# Patient Record
Sex: FEMALE | Race: WHITE | NOT HISPANIC OR LATINO
[De-identification: names, ages, dates, MRNs, and addresses within clinical notes are randomized per-mention and may not be internally consistent; named-entity substitution may affect disease eponyms.]

---

## 2024-05-02 PROBLEM — Z00.00 ENCOUNTER FOR PREVENTIVE HEALTH EXAMINATION: Status: ACTIVE | Noted: 2024-05-02

## 2024-05-03 ENCOUNTER — APPOINTMENT (OUTPATIENT)
Dept: ENDOCRINOLOGY | Facility: CLINIC | Age: 69
End: 2024-05-03
Payer: MEDICARE

## 2024-05-03 VITALS
DIASTOLIC BLOOD PRESSURE: 68 MMHG | HEIGHT: 65.5 IN | BODY MASS INDEX: 29.46 KG/M2 | OXYGEN SATURATION: 97 % | HEART RATE: 85 BPM | SYSTOLIC BLOOD PRESSURE: 114 MMHG | WEIGHT: 179 LBS

## 2024-05-03 DIAGNOSIS — E03.9 HYPOTHYROIDISM, UNSPECIFIED: ICD-10-CM

## 2024-05-03 DIAGNOSIS — Z83.49 FAMILY HISTORY OF OTHER ENDOCRINE, NUTRITIONAL AND METABOLIC DISEASES: ICD-10-CM

## 2024-05-03 DIAGNOSIS — R63.5 ABNORMAL WEIGHT GAIN: ICD-10-CM

## 2024-05-03 DIAGNOSIS — L65.9 NONSCARRING HAIR LOSS, UNSPECIFIED: ICD-10-CM

## 2024-05-03 DIAGNOSIS — Z13.820 ENCOUNTER FOR SCREENING FOR OSTEOPOROSIS: ICD-10-CM

## 2024-05-03 DIAGNOSIS — Z86.39 PERSONAL HISTORY OF OTHER ENDOCRINE, NUTRITIONAL AND METABOLIC DISEASE: ICD-10-CM

## 2024-05-03 DIAGNOSIS — Z78.0 ASYMPTOMATIC MENOPAUSAL STATE: ICD-10-CM

## 2024-05-03 PROCEDURE — 99204 OFFICE O/P NEW MOD 45 MIN: CPT

## 2024-05-03 RX ORDER — ELECTROLYTES/DEXTROSE
SOLUTION, ORAL ORAL DAILY
Refills: 0 | Status: ACTIVE | COMMUNITY

## 2024-05-03 RX ORDER — INDOMETHACIN 50 MG/1
50 CAPSULE ORAL
Refills: 0 | Status: ACTIVE | COMMUNITY

## 2024-05-03 RX ORDER — LEVOTHYROXINE SODIUM 50 UG/1
50 TABLET ORAL DAILY
Refills: 0 | Status: ACTIVE | COMMUNITY

## 2024-05-03 RX ORDER — ROSUVASTATIN CALCIUM 10 MG/1
10 TABLET, FILM COATED ORAL DAILY
Refills: 0 | Status: ACTIVE | COMMUNITY

## 2024-05-03 NOTE — HISTORY OF PRESENT ILLNESS
[FreeTextEntry1] : 68 year old women, former smoker, with h/o hypothyroidism, POI, and HLD presented to establish for hypothyroidism and hair loss.   Labs March 2024- TSH 2.5, LDL-C 119, Tg 142  Diagnosed with hypothyroidism about 3 years ago. Currently on LT4 50 mcg daily.   Underwent menopause at age 34. Was on HRT for 15 years afterwards.   Used to eat lots of cookies. Has difficulty with weight loss despite cutting back sweats.  Occasionally binge eats.  Reports hair loss. Saw dermatologist and was told she doesn't have any scalp issues.

## 2024-05-03 NOTE — PHYSICAL EXAM
[Well Nourished] : well nourished [No Respiratory Distress] : no respiratory distress [Normal Insight/Judgement] : insight and judgment were intact

## 2024-05-03 NOTE — ASSESSMENT
[FreeTextEntry1] :  1-hypothyroidism Currently on levothyroxine 50 mcg daily Plan: -Thyroid profile fasting every morning  2-overweight-difficulty with weight loss Believes she needs a higher dose of levothyroxine Plan:  -Will check thyroid profile, lipid panel and A1c  3-hair loss Plan:  Will rule out underlying endocrinology  Discussed considering topical treatments like minoxidil   4-screening DEXA scan  Will call you with lab results and discuss if follow-up appointment is needed.

## 2024-05-06 DIAGNOSIS — Z87.891 PERSONAL HISTORY OF NICOTINE DEPENDENCE: ICD-10-CM

## 2024-05-24 ENCOUNTER — APPOINTMENT (OUTPATIENT)
Dept: RADIOLOGY | Facility: CLINIC | Age: 69
End: 2024-05-24

## 2024-05-24 ENCOUNTER — APPOINTMENT (OUTPATIENT)
Dept: MAMMOGRAPHY | Facility: CLINIC | Age: 69
End: 2024-05-24
Payer: MEDICARE

## 2024-05-24 PROCEDURE — 77085 DXA BONE DENSITY AXL VRT FX: CPT

## 2024-05-24 PROCEDURE — 77067 SCR MAMMO BI INCL CAD: CPT

## 2024-05-24 PROCEDURE — 77063 BREAST TOMOSYNTHESIS BI: CPT

## 2024-05-28 ENCOUNTER — NON-APPOINTMENT (OUTPATIENT)
Age: 69
End: 2024-05-28

## 2024-06-03 ENCOUNTER — APPOINTMENT (OUTPATIENT)
Dept: OBGYN | Facility: CLINIC | Age: 69
End: 2024-06-03
Payer: MEDICARE

## 2024-06-03 VITALS
DIASTOLIC BLOOD PRESSURE: 74 MMHG | HEIGHT: 65.5 IN | SYSTOLIC BLOOD PRESSURE: 130 MMHG | BODY MASS INDEX: 29.63 KG/M2 | WEIGHT: 180 LBS

## 2024-06-03 DIAGNOSIS — Z01.419 ENCOUNTER FOR GYNECOLOGICAL EXAMINATION (GENERAL) (ROUTINE) W/OUT ABNORMAL FINDINGS: ICD-10-CM

## 2024-06-03 PROCEDURE — G0101: CPT

## 2024-06-03 NOTE — REVIEW OF SYSTEMS
[Patient Intake Form Reviewed] : Patient intake form was reviewed [FreeTextEntry8] : lump on vulva, skin tag mons pubis

## 2024-06-03 NOTE — DISCUSSION/SUMMARY
[FreeTextEntry1] : Unremarkable CBE and SBE reviewed Mammogram ordered Pelvic exam significant for skin tag on  mons pubis, cyst like mass under sking right vestibule, lesion vs hyperpigmentation posterior fourchette For Consult with Dr. Gómez with possible biopsy for above findings Pap/HPV collected I reviewed practices to support bone health including Vitamin D3 (2000 IU) and calcium rich foods with limitation on calcium supplementation by tabular form to 600 MG by mouth daily, a minimum of 30 minutes of weight bearing exercise at least 3 x weekly and limited daily sun exposure, 10-15 minutes.  Healthy diet, exercise and sleep hygiene discussed The importance of a screening colonoscopy was discussed.  Patient verbalizes understanding of and agreement with this plan.  All questions answered to patient's satisfaction.

## 2024-06-03 NOTE — PHYSICAL EXAM
[Appropriately responsive] : appropriately responsive [Alert] : alert [No Acute Distress] : no acute distress [No Lymphadenopathy] : no lymphadenopathy [Regular Rate Rhythm] : regular rate rhythm [No Murmurs] : no murmurs [Clear to Auscultation B/L] : clear to auscultation bilaterally [Soft] : soft [Non-tender] : non-tender [Non-distended] : non-distended [No HSM] : No HSM [No Lesions] : no lesions [No Mass] : no mass [Oriented x3] : oriented x3 [Examination Of The Breasts] : a normal appearance [No Masses] : no breast masses were palpable [Labia Majora] : normal [Labia Minora] : normal [Normal] : normal [Uterine Adnexae] : non-palpable [FreeTextEntry1] : 1 cm skin tag mons pubis [FreeTextEntry2] : firm round mass right vestibule [FreeTextEntry4] : hyperpigmentation vs lesion, posterior fourchette

## 2024-06-03 NOTE — HISTORY OF PRESENT ILLNESS
[postmenopausal] : postmenopausal [TextBox_4] : 68 year old  for well woman exam. No longer sexually active Hx hypothyroid on Synthroid. S/P BTL.  Born with a skin tag on lower mons pubis that was always flat has now grown over past year and is raised from surface of skin.  Also notes a lump upper vestibule [Mammogramdate] : 5/24 [PapSmeardate] : 2020 [BoneDensityDate] : 5/24 [ColonoscopyDate] : 2016 [PGHxTotal] : 3 [[facility]] : 2 [PGHxAbortions] : 1 [[facility]] : 2

## 2024-06-04 LAB — HPV HIGH+LOW RISK DNA PNL CVX: NOT DETECTED

## 2024-06-05 ENCOUNTER — NON-APPOINTMENT (OUTPATIENT)
Age: 69
End: 2024-06-05

## 2024-06-09 LAB — CYTOLOGY CVX/VAG DOC THIN PREP: ABNORMAL

## 2024-06-21 ENCOUNTER — APPOINTMENT (OUTPATIENT)
Dept: OBGYN | Facility: CLINIC | Age: 69
End: 2024-06-21

## 2024-08-06 ENCOUNTER — OFFICE (OUTPATIENT)
Dept: URBAN - METROPOLITAN AREA CLINIC 12 | Facility: CLINIC | Age: 69
Setting detail: OPHTHALMOLOGY
End: 2024-08-06
Payer: MEDICARE

## 2024-08-06 ENCOUNTER — RX ONLY (RX ONLY)
Age: 69
End: 2024-08-06

## 2024-08-06 DIAGNOSIS — H35.3131: ICD-10-CM

## 2024-08-06 DIAGNOSIS — H26.491: ICD-10-CM

## 2024-08-06 DIAGNOSIS — H16.223: ICD-10-CM

## 2024-08-06 DIAGNOSIS — H43.393: ICD-10-CM

## 2024-08-06 PROBLEM — Z96.1 PSEUDOPHAKIA ; BOTH EYES: Status: ACTIVE | Noted: 2024-08-06

## 2024-08-06 PROBLEM — H52.7 REFRACTIVE ERROR ; BOTH EYES: Status: ACTIVE | Noted: 2024-08-06

## 2024-08-06 PROCEDURE — 92134 CPTRZ OPH DX IMG PST SGM RTA: CPT | Performed by: OPHTHALMOLOGY

## 2024-08-06 PROCEDURE — 99204 OFFICE O/P NEW MOD 45 MIN: CPT | Performed by: OPHTHALMOLOGY

## 2024-08-06 ASSESSMENT — CONFRONTATIONAL VISUAL FIELD TEST (CVF)
OD_FINDINGS: FULL
OS_FINDINGS: FULL

## 2025-03-31 ENCOUNTER — OFFICE (OUTPATIENT)
Dept: URBAN - METROPOLITAN AREA CLINIC 38 | Facility: CLINIC | Age: 70
Setting detail: OPHTHALMOLOGY
End: 2025-03-31
Payer: MEDICARE

## 2025-03-31 DIAGNOSIS — Z96.1: ICD-10-CM

## 2025-03-31 DIAGNOSIS — H01.004: ICD-10-CM

## 2025-03-31 DIAGNOSIS — H11.153: ICD-10-CM

## 2025-03-31 DIAGNOSIS — H00.025: ICD-10-CM

## 2025-03-31 DIAGNOSIS — H26.491: ICD-10-CM

## 2025-03-31 DIAGNOSIS — H35.3131: ICD-10-CM

## 2025-03-31 DIAGNOSIS — H43.811: ICD-10-CM

## 2025-03-31 DIAGNOSIS — H35.40: ICD-10-CM

## 2025-03-31 DIAGNOSIS — H01.001: ICD-10-CM

## 2025-03-31 PROCEDURE — 92250 FUNDUS PHOTOGRAPHY W/I&R: CPT | Performed by: OPHTHALMOLOGY

## 2025-03-31 PROCEDURE — 92012 INTRM OPH EXAM EST PATIENT: CPT | Performed by: OPHTHALMOLOGY

## 2025-03-31 ASSESSMENT — REFRACTION_MANIFEST
OD_VA1: 20/25
OS_SPHERE: PLANO
OD_CYLINDER: -1.25
OD_AXIS: 095
OD_SPHERE: +0.25
OS_ADD: +2.25
OS_VA2: 20/20
OD_VA2: 20/20
OS_VA1: 20/25
OS_AXIS: 090
OS_CYLINDER: -1.75
OU_VA: 20/20-2
OD_ADD: +2.25

## 2025-03-31 ASSESSMENT — REFRACTION_CURRENTRX
OS_VPRISM_DIRECTION: PROGS
OS_AXIS: 096
OD_AXIS: 098
OD_SPHERE: PLANO
OD_ADD: +2.75
OS_OVR_VA: 20/
OS_SPHERE: PLANO
OD_CYLINDER: -1.25
OD_OVR_VA: 20/
OD_VPRISM_DIRECTION: PROGS
OS_ADD: +2.75
OS_CYLINDER: -1.50

## 2025-03-31 ASSESSMENT — CONFRONTATIONAL VISUAL FIELD TEST (CVF)
OS_FINDINGS: FULL
OD_FINDINGS: FULL

## 2025-03-31 ASSESSMENT — VISUAL ACUITY
OS_BCVA: 20/25
OD_BCVA: 20/30+3

## 2025-06-12 PROBLEM — Z12.39 BREAST SCREENING: Status: ACTIVE | Noted: 2025-06-11

## 2025-06-13 ENCOUNTER — APPOINTMENT (OUTPATIENT)
Dept: OBGYN | Facility: CLINIC | Age: 70
End: 2025-06-13
Payer: MEDICARE

## 2025-06-13 VITALS
WEIGHT: 178 LBS | BODY MASS INDEX: 29.66 KG/M2 | HEIGHT: 65 IN | DIASTOLIC BLOOD PRESSURE: 76 MMHG | SYSTOLIC BLOOD PRESSURE: 106 MMHG

## 2025-06-13 PROBLEM — N76.4 VULVAR ABSCESS: Status: ACTIVE | Noted: 2025-06-13

## 2025-06-13 PROBLEM — Z01.419 ENCOUNTER FOR ANNUAL ROUTINE GYNECOLOGICAL EXAMINATION: Status: ACTIVE | Noted: 2025-06-13

## 2025-06-13 PROCEDURE — 82270 OCCULT BLOOD FECES: CPT

## 2025-06-13 PROCEDURE — G0101: CPT

## 2025-06-17 LAB — CYTOLOGY CVX/VAG DOC THIN PREP: ABNORMAL

## 2025-08-19 ENCOUNTER — APPOINTMENT (OUTPATIENT)
Dept: MAMMOGRAPHY | Facility: CLINIC | Age: 70
End: 2025-08-19
Payer: MEDICARE

## 2025-08-19 ENCOUNTER — RESULT REVIEW (OUTPATIENT)
Age: 70
End: 2025-08-19

## 2025-08-19 PROCEDURE — 77067 SCR MAMMO BI INCL CAD: CPT | Mod: TC

## 2025-08-19 PROCEDURE — 77063 BREAST TOMOSYNTHESIS BI: CPT | Mod: TC
